# Patient Record
Sex: MALE | Race: BLACK OR AFRICAN AMERICAN
[De-identification: names, ages, dates, MRNs, and addresses within clinical notes are randomized per-mention and may not be internally consistent; named-entity substitution may affect disease eponyms.]

---

## 2019-12-09 ENCOUNTER — HOSPITAL ENCOUNTER (EMERGENCY)
Dept: HOSPITAL 87 - ER | Age: 39
Discharge: LEFT BEFORE DISCHARGE | End: 2019-12-09
Payer: MEDICAID

## 2019-12-09 VITALS — BODY MASS INDEX: 26.57 KG/M2 | WEIGHT: 165.35 LBS | HEIGHT: 66 IN

## 2019-12-09 VITALS — SYSTOLIC BLOOD PRESSURE: 140 MMHG | DIASTOLIC BLOOD PRESSURE: 79 MMHG

## 2019-12-09 DIAGNOSIS — Z53.21: Primary | ICD-10-CM

## 2020-08-12 ENCOUNTER — HOSPITAL ENCOUNTER (EMERGENCY)
Dept: HOSPITAL 72 - EMR | Age: 40
Discharge: ANOTHER HOSPITAL | End: 2020-08-12
Payer: COMMERCIAL

## 2020-08-12 VITALS — SYSTOLIC BLOOD PRESSURE: 137 MMHG | DIASTOLIC BLOOD PRESSURE: 95 MMHG

## 2020-08-12 VITALS — DIASTOLIC BLOOD PRESSURE: 114 MMHG | SYSTOLIC BLOOD PRESSURE: 197 MMHG

## 2020-08-12 VITALS — SYSTOLIC BLOOD PRESSURE: 196 MMHG | DIASTOLIC BLOOD PRESSURE: 123 MMHG

## 2020-08-12 VITALS — DIASTOLIC BLOOD PRESSURE: 100 MMHG | SYSTOLIC BLOOD PRESSURE: 182 MMHG

## 2020-08-12 VITALS — WEIGHT: 180 LBS | HEIGHT: 70 IN | BODY MASS INDEX: 25.77 KG/M2

## 2020-08-12 VITALS — DIASTOLIC BLOOD PRESSURE: 84 MMHG | SYSTOLIC BLOOD PRESSURE: 145 MMHG

## 2020-08-12 VITALS — SYSTOLIC BLOOD PRESSURE: 160 MMHG | DIASTOLIC BLOOD PRESSURE: 100 MMHG

## 2020-08-12 DIAGNOSIS — R00.0: ICD-10-CM

## 2020-08-12 DIAGNOSIS — N18.6: ICD-10-CM

## 2020-08-12 DIAGNOSIS — R79.89: ICD-10-CM

## 2020-08-12 DIAGNOSIS — F19.10: ICD-10-CM

## 2020-08-12 DIAGNOSIS — Z99.2: ICD-10-CM

## 2020-08-12 DIAGNOSIS — I12.0: Primary | ICD-10-CM

## 2020-08-12 DIAGNOSIS — I16.0: ICD-10-CM

## 2020-08-12 LAB
ADD MANUAL DIFF: NO
ALBUMIN SERPL-MCNC: 3.8 G/DL (ref 3.4–5)
ALBUMIN/GLOB SERPL: 1 {RATIO} (ref 1–2.7)
ALP SERPL-CCNC: 306 U/L (ref 46–116)
ALT SERPL-CCNC: 21 U/L (ref 12–78)
ANION GAP SERPL CALC-SCNC: 17 MMOL/L (ref 5–15)
APTT BLD: 28 SEC (ref 23–33)
AST SERPL-CCNC: 19 U/L (ref 15–37)
BASOPHILS NFR BLD AUTO: 1.6 % (ref 0–2)
BILIRUB SERPL-MCNC: 0.4 MG/DL (ref 0.2–1)
BUN SERPL-MCNC: 78 MG/DL (ref 7–18)
CALCIUM SERPL-MCNC: 10.3 MG/DL (ref 8.5–10.1)
CHLORIDE SERPL-SCNC: 99 MMOL/L (ref 98–107)
CO2 SERPL-SCNC: 27 MMOL/L (ref 21–32)
CREAT SERPL-MCNC: 23.1 MG/DL (ref 0.55–1.3)
EOSINOPHIL NFR BLD AUTO: 16.4 % (ref 0–3)
ERYTHROCYTE [DISTWIDTH] IN BLOOD BY AUTOMATED COUNT: 18.1 % (ref 11.6–14.8)
GLOBULIN SER-MCNC: 3.8 G/DL
HCT VFR BLD CALC: 36.4 % (ref 42–52)
HGB BLD-MCNC: 11.6 G/DL (ref 14.2–18)
INR PPP: 1.1 (ref 0.9–1.1)
LYMPHOCYTES NFR BLD AUTO: 17.2 % (ref 20–45)
MCV RBC AUTO: 93 FL (ref 80–99)
MONOCYTES NFR BLD AUTO: 7.3 % (ref 1–10)
NEUTROPHILS NFR BLD AUTO: 57.5 % (ref 45–75)
PLATELET # BLD: 163 K/UL (ref 150–450)
POTASSIUM SERPL-SCNC: 5 MMOL/L (ref 3.5–5.1)
RBC # BLD AUTO: 3.89 M/UL (ref 4.7–6.1)
SODIUM SERPL-SCNC: 143 MMOL/L (ref 136–145)
WBC # BLD AUTO: 6 K/UL (ref 4.8–10.8)

## 2020-08-12 PROCEDURE — 96374 THER/PROPH/DIAG INJ IV PUSH: CPT

## 2020-08-12 PROCEDURE — 71045 X-RAY EXAM CHEST 1 VIEW: CPT

## 2020-08-12 PROCEDURE — 96376 TX/PRO/DX INJ SAME DRUG ADON: CPT

## 2020-08-12 PROCEDURE — 85025 COMPLETE CBC W/AUTO DIFF WBC: CPT

## 2020-08-12 PROCEDURE — 93005 ELECTROCARDIOGRAM TRACING: CPT

## 2020-08-12 PROCEDURE — 85610 PROTHROMBIN TIME: CPT

## 2020-08-12 PROCEDURE — 36415 COLL VENOUS BLD VENIPUNCTURE: CPT

## 2020-08-12 PROCEDURE — 87081 CULTURE SCREEN ONLY: CPT

## 2020-08-12 PROCEDURE — 96375 TX/PRO/DX INJ NEW DRUG ADDON: CPT

## 2020-08-12 PROCEDURE — 99291 CRITICAL CARE FIRST HOUR: CPT

## 2020-08-12 PROCEDURE — 83880 ASSAY OF NATRIURETIC PEPTIDE: CPT

## 2020-08-12 PROCEDURE — 80053 COMPREHEN METABOLIC PANEL: CPT

## 2020-08-12 PROCEDURE — 84484 ASSAY OF TROPONIN QUANT: CPT

## 2020-08-12 PROCEDURE — 85730 THROMBOPLASTIN TIME PARTIAL: CPT

## 2020-08-12 NOTE — DIAGNOSTIC IMAGING REPORT
Indication: Chest pain

 

Technique: One view of the chest

 

Comparison: None

 

Findings: There is questionably very subtle peripheral hazy infiltrates . No dense

consolidation. Pleural spaces are clear. Heart size is normal

 

Impression: Questionable subtle hazy peripheral infiltrates, could indicate

multifocal pneumonia if real. Correlate with clinical findings

## 2020-08-12 NOTE — EMERGENCY ROOM REPORT
History of Present Illness


General


Chief Complaint:  Overdose


Source:  Patient, EMS





Present Illness


HPI


Disclaimer: Please note that this report is being documented using [employer]AG[employer] 

technology. This can lead to erroneous entry secondary to incorrect 

interpretation by the dictating instrument.





HPI: 39-year-old male with history of ESRD on hemodialysis MWF and substance 

abuse presents for evaluation of chest pain after using PCP.  Patient states he 

was using PCP yesterday after which developed a pinching sensation over the 

left side of the chest.  Denies shortness of breath, cough, swelling.  Chest 

pain is been intermittent since.  Does not seem to be related to activity or 

relieved by rest.  Denies pain in the back, headache, vision changes, fever, 

chills, abdominal pain, nausea, vomiting.  He is scheduled for hemodialysis 

today but missed his appointment.  He received a full course of hemodialysis on 

Monday.  Denies alcohol or other drug use.


 


PMH: ESRD, hypertension, substance abuse


 


PSH: Dialysis access catheters


 


Allergies: None reported


 


Social Hx: PCP use


Allergies:  


Coded Allergies:  


     No Known Allergies (Unverified , 8/12/20)





COVID-19 Screening


Contact w/high risk pt:  No


Experienced COVID-19 symptoms?:  No


COVID-19 Testing performed PTA:  No





Review of Systems


All Other Systems:  negative except mentioned in HPI





Physical Exam





Vital Signs








  Date Time  Temp Pulse Resp B/P (MAP) Pulse Ox O2 Delivery O2 Flow Rate FiO2


 


8/12/20 15:18 98.1 104 14 196/123 (147) 97 Room Air  





 





General: Awake and alert, no acute distress, hypertensive


HEENT: NC/AT. EOMI. 


Cardiovascular: Slightly tachycardic.  S1 and S2 normal.  Fistula with palpable 

thrill in right upper extremity.


Resp: Normal work of breathing. No cough, wheezing or crackles appreciated


Abdomen: Abdomen is soft, nondistended.  Nontender


Skin: Intact.  No abrasions, laceration or rash over the exposed skin


MSK: Normal tone and bulk. Moving all extremities.  No obvious deformity.


Neuro: Awake and alert.  Mentating appropriately.





Procedures


Critical Care Time


Critical Care Time


Total critical care time: Approximately 45 minutes





Due to a high probability of clinically significant, life threatening 

deterioration, the patient required the highest level of preparedness to 

intervene emergently and I personally spent this critical care time directly 

and personally managing the patient. This critical care time included obtaining 

a history, examining the patient, pulse oximetry, ordering and reviewing studies

, ordering treatments, evaluating response to treatment and updating management 

plan as needed, frequent reassessment and discussion with other providers as 

well as arranging for ultimate disposition.  This critical to care time was 

performed to assess and manage the high probability of life-threatening 

deterioration that could result in multiorgan failure.  This critical care time 

is separate from the separately billable procedures and treating other patients.





Medical Decision Making


Diagnostic Impression:  


 Primary Impression:  


 ESRD (end stage renal disease) on dialysis


 Additional Impressions:  


 Hypertensive urgency


 Substance abuse


 Elevated troponin


ER Course


39-year-old male with history of ESRD on hemodialysis MWF presents for 

evaluation of chest discomfort after PCP use.  Also missed dialysis today.  

Differential includes was not limited to chest pain secondary to drug use, ACS, 

arrhythmia, electrolyte abnormality.  Patient is an uric and therefore cannot 

order drug screen but will order blood work including cardiac labs, BN peptide, 

chest x-ray.





1900:


EKG does not show signs of acute ischemia or peak T waves.  Chest x-ray does 

not show evidence of fluid overload or infiltrates.  BNP elevated greater than 

15,000, troponin slightly elevated 0.088.  No prior for comparison.  Labs 

consistent with end-stage renal disease with elevated BUN/creatinine.  Patient'

s blood pressure showed some initial improvement but remains hypertensive.  He 

was treated with hydralazine and nitroglycerin ointment.  He will require trend 

tropes, dialysis. He will be transferred to his [facility] hospital ([facility]

) according to his insurance provider.  Dr. Hopper accepting physician.  Patient 

is stable for transfer.





Laboratory Tests








Test


  8/12/20


16:25


 


White Blood Count


  6.0 K/UL


(4.8-10.8)


 


Red Blood Count


  3.89 M/UL


(4.70-6.10)  L


 


Hemoglobin


  11.6 G/DL


(14.2-18.0)  L


 


Hematocrit


  36.4 %


(42.0-52.0)  L


 


Mean Corpuscular Volume 93 FL (80-99)  


 


Mean Corpuscular Hemoglobin


  29.9 PG


(27.0-31.0)


 


Mean Corpuscular Hemoglobin


Concent 32.0 G/DL


(32.0-36.0)


 


Red Cell Distribution Width


  18.1 %


(11.6-14.8)  H


 


Platelet Count


  163 K/UL


(150-450)


 


Mean Platelet Volume


  6.5 FL


(6.5-10.1)


 


Neutrophils (%) (Auto)


  57.5 %


(45.0-75.0)


 


Lymphocytes (%) (Auto)


  17.2 %


(20.0-45.0)  L


 


Monocytes (%) (Auto)


  7.3 %


(1.0-10.0)


 


Eosinophils (%) (Auto)


  16.4 %


(0.0-3.0)  H


 


Basophils (%) (Auto)


  1.6 %


(0.0-2.0)


 


Prothrombin Time


  12.0 SEC


(9.30-11.50)  H


 


Prothrombin Time INR 1.1 (0.9-1.1)  


 


Activated Partial


Thromboplast Time 28 SEC (23-33)


 


 


Sodium Level


  143 MMOL/L


(136-145)


 


Potassium Level


  5.0 MMOL/L


(3.5-5.1)


 


Chloride Level


  99 MMOL/L


()


 


Carbon Dioxide Level


  27 MMOL/L


(21-32)


 


Anion Gap


  17 mmol/L


(5-15)  H


 


Blood Urea Nitrogen


  78 mg/dL


(7-18)  H


 


Creatinine


  23.1 MG/DL


(0.55-1.30)  H


 


Estimated Glomerular


Filtration Rate 2.7 mL/min


(>60)


 


Glucose Level


  79 MG/DL


()


 


Calcium Level


  10.3 MG/DL


(8.5-10.1)  H


 


Total Bilirubin


  0.4 MG/DL


(0.2-1.0)


 


Aspartate Amino Transferase


(AST) 19 U/L (15-37)


 


 


Alanine Aminotransferase (ALT)


  21 U/L (12-78)


 


 


Alkaline Phosphatase


  306 U/L


()  H


 


Troponin I


  0.088 ng/mL


(0.000-0.056)


 


Pro-B-Type Natriuretic Peptide


  70908 pg/mL


(0-125)  H


 


Total Protein


  7.6 G/DL


(6.4-8.2)


 


Albumin


  3.8 G/DL


(3.4-5.0)


 


Globulin 3.8 g/dL  


 


Albumin/Globulin Ratio 1.0 (1.0-2.7)  


 


Salicylates Level


  2.3 ug/mL


(2.8-20)  L


 


Acetaminophen Level


  < 2 MCG/ML


(10-30)  L


 


Serum Alcohol < 3 mg/dL  








EKG Diagnostic Results


EKG Time:  15:50


Rate:  normal


Rhythm:  NSR


ST Segments:  no acute changes


Other Impression


Sinus rhythm, normal axis, normal intervals, no significant T wave peaking.  No 

ST segment changes.





Rhythm Strip Diag. Results


Rhythm Strip Time:  15:50


EP Interpretation:  yes


Rate:  95


Rhythm:  NSR, no PVC's, no ectopy





Chest X-Ray Diagnostic Results


Chest X-Ray Diagnostic Results :  


   Chest X-Ray Ordered:  Yes


   # of Views/Limited/Complete:  1 View


   Indication:  Chest Pain


   EP Interpretation:  Yes


   Interpretation:  no consolidation, no effusion, no pneumothorax, no acute 

cardiopulmonary disease


   Impression:  No acute disease


   Electronically Signed by:  Electronically signed by Dr. Jamie Panda





Last Vital Signs








  Date Time  Temp Pulse Resp B/P (MAP) Pulse Ox O2 Delivery O2 Flow Rate FiO2


 


8/12/20 15:18 98.1 104 14 196/123 (147) 97 Room Air  








Disposition:  SHORT-TERM HOSP


Condition:  Stable











Jamie Panda MD Aug 12, 2020 15:33

## 2020-08-12 NOTE — NUR
ER DISCHARGE NOTE:



Patient is cleared to be transferred to [facility] per ERMD, pt is 
aox4, on room air, with stable vital signs. [facility] was given 
patient packet, report given to [facility] Sheriff at [facility]. Patient 
transported via gurney accompanied by ambulance personnel unit 14. Patient 
stable during transfer. No acute distress noted.

## 2020-08-12 NOTE — NUR
ED Nurse Note:



Patient was BIBA accompany by [employer] due to OD on PCP. Patient presented calm, 
cooperative, AAO x4, VSS at this time, skin is warm to touch. Patient has shunt 
on his right upper arm, pt got his last dialysis on Monday, and missed on 
Wednesday.